# Patient Record
(demographics unavailable — no encounter records)

---

## 2025-05-01 NOTE — DISCUSSION/SUMMARY
[de-identified] : (Impression) -right shoulder grade 1 AC sprain  The diagnosis was explained in detail. The potential non-surgical and surgical treatments were reviewed. The relative risks and benefits of each option were considered relative to the patient age, activity level, medical history, symptom severity and previously attempted treatments.  The patient was advised to consult with their primary medical provider prior to initiation of any new medications to reduce the risk of adverse effects specific to their long-term home medications and medical history. The risk of gastrointestinal irritation and kidney injury specific to long-term NSAID use was discussed.    (Plan)  -Physical therapy recommended for stretching and strengthening. -Cortisone injection is deferred at this time. -Meloxicam for pain and inflammation, take as needed. -Topical voltaren gel as needed for pain. -MRI is deferred at this time. -Follow up in 4 weeks. If symptoms persist consider MRI.     (MDM) Problem Complexity -Moderate: acute, complicated injury   Risk -Moderate: prescription medication   Visit Type -New: Patient has not been seen by another provider in my practice within the past 2 years who specializes in orthopedic surgery.

## 2025-05-01 NOTE — HISTORY OF PRESENT ILLNESS
[de-identified] : Date of initial evaluation: 05/01/2025 Patient age: 31 year Body part causing symptoms: right shoulder Location of the pain: anterior, superior Pain score today: 4/10 Duration of symptoms: 3 weeks  History of injury: developed shoulder pain while in a push up position during yoga  Activities that worsen the pain: overhead movement, cross body motions Radicular symptoms: none  Medications attempted for this problem: none  PT for this problem: none  Injections for this problem: none  Previous surgery on this body part: none  Prior imaging: none  Occupation: IT for [employer]

## 2025-05-01 NOTE — IMAGING
[de-identified] : (Exam: Shoulder)   Laterality is right    Patient is in no acute distress, alert and oriented Sensation is grossly intact to light touch in the hand Motor function is 5/5 in the hand Capillary refill is less than 2 seconds in the fingers Lymphadenopathy is not present Peripheral edema is not present   Skin is intact Swelling is not present Atrophy is not present Scapular winging is not present Deformity of the AC joint is not present Deformity of the biceps is not present   Bicipital groove tenderness is present AC joint tenderness is present Scapulothoracic tenderness is not present   Active forward elevation is 150 Passive forward elevation is 170 External rotation at the side is 60 Internal rotation behind the back is to the level of T12   Forward elevation strength is 4/5 External rotation strength at the side is 4/5 Internal rotation strength at the side is 5/5 Deltoid strength of anterior, posterior and lateral heads is 5/5   Rivas test is abnormal OBriens test is abnormal Empty can test is abnormal Speeds test is abnormal Cross body adduction test is abnormal Belly press test is normal Apprehension and relocation is normal Sulcus sign is normal [Right] : right shoulder [There are no fractures, subluxations or dislocations. No significant abnormalities are seen] : There are no fractures, subluxations or dislocations. No significant abnormalities are seen None

## 2025-06-26 NOTE — ASSESSMENT
[Vaccines Reviewed] : Immunizations reviewed today. Please see immunization details in the vaccine log within the immunization flowsheet.  [FreeTextEntry1] : Annual physical/wellness visit performed today Routine measurements including height, weight, BMI, and blood pressure performed. Medications reviewed and reconciled.  Tobacco, alcohol, and drug screening performed.  Annual depression screening performed.  Diet and exercise discussed.  Routine screening labs ordered.   Preventative Services checklist reviewed including:  Annual Flu Vaccine-due FALL 2025 Vaccines- UTD Pap Smear-UTD

## 2025-06-26 NOTE — HISTORY OF PRESENT ILLNESS
[FreeTextEntry1] : CPE [de-identified] : Ms. TRACE PRABHAKAR is a 31 year old female who presents to the office for CPE Started on metformin for PCOS 3 weeks ago.

## 2025-06-26 NOTE — HISTORY OF PRESENT ILLNESS
[FreeTextEntry1] : CPE [de-identified] : Ms. TRACE PRABHAKAR is a 31 year old female who presents to the office for CPE Started on metformin for PCOS 3 weeks ago.

## 2025-06-26 NOTE — HEALTH RISK ASSESSMENT
[No falls in past year] : Patient reported no falls in the past year [Patient reported PAP Smear was normal] : Patient reported PAP Smear was normal [No] : No [0] : 2) Feeling down, depressed, or hopeless: Not at all (0) [PHQ-2 Negative - No further assessment needed] : PHQ-2 Negative - No further assessment needed [Time Spent: ___ Minutes] : I spent [unfilled] minutes performing a depression screening for this patient. [Never] : Never [Alone] : lives alone [Employed] : employed [Single] : single [Feels Safe at Home] : Feels safe at home [Fully functional (bathing, dressing, toileting, transferring, walking, feeding)] : Fully functional (bathing, dressing, toileting, transferring, walking, feeding) [Fully functional (using the telephone, shopping, preparing meals, housekeeping, doing laundry, using] : Fully functional and needs no help or supervision to perform IADLs (using the telephone, shopping, preparing meals, housekeeping, doing laundry, using transportation, managing medications and managing finances) [Reports normal functional visual acuity (ie: able to read med bottle)] : Reports normal functional visual acuity [Smoke Detector] : smoke detector [Carbon Monoxide Detector] : carbon monoxide detector [Very Good] : ~his/her~  mood as very good [Little interest or pleasure doing things] : 1) Little interest or pleasure doing things [Feeling down, depressed, or hopeless] : 2) Feeling down, depressed, or hopeless [None] : None [de-identified] : n/a [de-identified] : GYN, PT [de-identified] : undergoing PT 2x/week for 6 weeks. - for torn pectoralis, minimal  [de-identified] : more protein, low carb, lean protein  [[id number]] : 0 [EyeExamDate] : 12/2024 [Change in mental status noted] : No change in mental status noted [Reports changes in hearing] : Reports no changes in hearing [Reports changes in vision] : Reports no changes in vision [MammogramComments] : family hx breast CA- maternal aunt- diagnosed [PapSmearDate] : 05/2025

## 2025-06-26 NOTE — PHYSICAL EXAM
[Supple] : supple [Pedal Pulses Present] : the pedal pulses are present [No Edema] : there was no peripheral edema [Soft] : abdomen soft [Non Tender] : non-tender [Non-distended] : non-distended [Normal Bowel Sounds] : normal bowel sounds [No Spinal Tenderness] : no spinal tenderness [Grossly Normal Strength/Tone] : grossly normal strength/tone [Normal] : affect was normal and insight and judgment were intact [de-identified] : Right upper chest wall- firm lumpy mass- mild TTP, 2" x 1" ellipitical in shape

## 2025-06-26 NOTE — SIGNATURES
[TextEntry] : Delilah Ricketts D.O.  Family Medicine [place] [address] [phone]

## 2025-06-26 NOTE — REVIEW OF SYSTEMS
[Negative] : Psychiatric [Postnasal Drip] : no postnasal drip [Dysuria] : no dysuria [Hematuria] : no hematuria [Joint Pain] : no joint pain [Muscle Pain] : no muscle pain [Skin Rash] : no skin rash [Headache] : no headache [Swollen Glands] : no swollen glands

## 2025-06-26 NOTE — SIGNATURES
[TextEntry] : Delilah Ricketts D.O.  Family Medicine Physician [place] [address] [phone]

## 2025-06-26 NOTE — HEALTH RISK ASSESSMENT
[No falls in past year] : Patient reported no falls in the past year [Patient reported PAP Smear was normal] : Patient reported PAP Smear was normal [No] : No [0] : 2) Feeling down, depressed, or hopeless: Not at all (0) [PHQ-2 Negative - No further assessment needed] : PHQ-2 Negative - No further assessment needed [Time Spent: ___ Minutes] : I spent [unfilled] minutes performing a depression screening for this patient. [Never] : Never [Alone] : lives alone [Employed] : employed [Single] : single [Feels Safe at Home] : Feels safe at home [Fully functional (bathing, dressing, toileting, transferring, walking, feeding)] : Fully functional (bathing, dressing, toileting, transferring, walking, feeding) [Fully functional (using the telephone, shopping, preparing meals, housekeeping, doing laundry, using] : Fully functional and needs no help or supervision to perform IADLs (using the telephone, shopping, preparing meals, housekeeping, doing laundry, using transportation, managing medications and managing finances) [Reports normal functional visual acuity (ie: able to read med bottle)] : Reports normal functional visual acuity [Smoke Detector] : smoke detector [Carbon Monoxide Detector] : carbon monoxide detector [Very Good] : ~his/her~  mood as very good [Little interest or pleasure doing things] : 1) Little interest or pleasure doing things [Feeling down, depressed, or hopeless] : 2) Feeling down, depressed, or hopeless [None] : None [de-identified] : n/a [de-identified] : GYN, PT [de-identified] : undergoing PT 2x/week for 6 weeks. - for torn pectoralis, minimal  [de-identified] : more protein, low carb, lean protein  [[id number]] : 0 [EyeExamDate] : 12/2024 [Change in mental status noted] : No change in mental status noted [Reports changes in hearing] : Reports no changes in hearing [Reports changes in vision] : Reports no changes in vision [MammogramComments] : family hx breast CA- maternal aunt- diagnosed [PapSmearDate] : 05/2025

## 2025-06-26 NOTE — PHYSICAL EXAM
[Supple] : supple [Pedal Pulses Present] : the pedal pulses are present [No Edema] : there was no peripheral edema [Soft] : abdomen soft [Non Tender] : non-tender [Non-distended] : non-distended [Normal Bowel Sounds] : normal bowel sounds [No Spinal Tenderness] : no spinal tenderness [Grossly Normal Strength/Tone] : grossly normal strength/tone [Normal] : affect was normal and insight and judgment were intact [de-identified] : Right upper chest wall- firm lumpy mass- mild TTP, 2" x 1" ellipitical in shape